# Patient Record
Sex: FEMALE | Race: WHITE | URBAN - METROPOLITAN AREA
[De-identification: names, ages, dates, MRNs, and addresses within clinical notes are randomized per-mention and may not be internally consistent; named-entity substitution may affect disease eponyms.]

---

## 2021-05-11 ENCOUNTER — OFFICE VISIT (OUTPATIENT)
Dept: NEUROLOGY | Facility: CLINIC | Age: 66
End: 2021-05-11
Payer: COMMERCIAL

## 2021-05-11 VITALS
DIASTOLIC BLOOD PRESSURE: 76 MMHG | WEIGHT: 151 LBS | SYSTOLIC BLOOD PRESSURE: 124 MMHG | HEART RATE: 80 BPM | RESPIRATION RATE: 16 BRPM

## 2021-05-11 DIAGNOSIS — R42 DIZZINESS AND GIDDINESS: Primary | ICD-10-CM

## 2021-05-11 DIAGNOSIS — G43.011 INTRACTABLE MIGRAINE WITHOUT AURA AND WITH STATUS MIGRAINOSUS: ICD-10-CM

## 2021-05-11 DIAGNOSIS — H81.13 BENIGN PAROXYSMAL VERTIGO OF BOTH EARS: ICD-10-CM

## 2021-05-11 DIAGNOSIS — G44.211 INTRACTABLE EPISODIC TENSION-TYPE HEADACHE: ICD-10-CM

## 2021-05-11 PROBLEM — G44.209 TENSION TYPE HEADACHE: Status: ACTIVE | Noted: 2021-05-11

## 2021-05-11 PROBLEM — G43.009 MIGRAINE WITHOUT AURA: Status: ACTIVE | Noted: 2021-05-11

## 2021-05-11 PROCEDURE — 3074F SYST BP LT 130 MM HG: CPT | Performed by: OTHER

## 2021-05-11 PROCEDURE — 3078F DIAST BP <80 MM HG: CPT | Performed by: OTHER

## 2021-05-11 PROCEDURE — 99204 OFFICE O/P NEW MOD 45 MIN: CPT | Performed by: OTHER

## 2021-05-11 NOTE — PROGRESS NOTES
Patient reports dizziness since end of December 2019 and saw ENT Feb 2020 and exam normal. The dizziness increased over this past Winter and has been taking Meclizine.   The dizziness isn't always related to headache, Has has headache on and off left temple

## 2021-05-11 NOTE — PROGRESS NOTES
[facility]   Neurology; INITIAL CLINIC VISIT  2021, 2:39 PM     Courtney Mchugh Patient Status:  No patient class for patient encounter    11/10/1955 MRN [id number]   Location [address] [place] [address] congestion, sinus pain or sore throat; no  hearing loss;  RESPIRATORY: denies shortness of breath, wheezing or cough   CARDIOVASCULAR: denies chest pain, no palpitations   GI: denies nausea, vomiting, constipation, diarrhea; no rectal bleeding; no heartbur Masseters strong, Facial sensations normal,        CN  VII:  Symmetric facial movement       CN VIII:  Normal hearing       CN IX, XI:  Normal Gag, uvula palate midline       CN XII:  SCM strong, equal shoulder shrug  Motor:  No drift, rapid finger movemen satisfactory status. The patient was instructed to go to local ER if current symptoms worse or significant new symptoms arise. They understood my instruction.      Jael Durbin MD  General Neurology   Neuromuscular/ Electrodiagnostic Specialist  [facility]

## 2021-08-11 ENCOUNTER — TELEPHONE (OUTPATIENT)
Dept: NEUROLOGY | Facility: CLINIC | Age: 66
End: 2021-08-11

## 2021-08-11 NOTE — TELEPHONE ENCOUNTER
Per PSR: Pt missed jury duty on Monday because of vertigo issue. Joanne Kemp needs a letter from Dr. Jackson Gee to excuse pt because of this issue,  Call to advise    Per chart review, pt did not call on Monday, will route to provider to advise on letter

## (undated) NOTE — LETTER
Date: 8/11/2021    Patient Name: Courtney Mchugh    To Whom it may concern:     This letter has been written at the patient's request. It is my professional medical opinion that due to the nature of her diagnoses this patient should be excused from jury dut